# Patient Record
Sex: MALE | Race: WHITE | NOT HISPANIC OR LATINO | Employment: OTHER | URBAN - METROPOLITAN AREA
[De-identification: names, ages, dates, MRNs, and addresses within clinical notes are randomized per-mention and may not be internally consistent; named-entity substitution may affect disease eponyms.]

---

## 2021-10-08 ENCOUNTER — CONSULT (OUTPATIENT)
Dept: NEUROSURGERY | Facility: CLINIC | Age: 76
End: 2021-10-08
Payer: MEDICARE

## 2021-10-08 VITALS
HEART RATE: 79 BPM | WEIGHT: 200 LBS | BODY MASS INDEX: 28.63 KG/M2 | DIASTOLIC BLOOD PRESSURE: 84 MMHG | SYSTOLIC BLOOD PRESSURE: 124 MMHG | TEMPERATURE: 97.9 F | HEIGHT: 70 IN

## 2021-10-08 DIAGNOSIS — M48.062 LUMBAR STENOSIS WITH NEUROGENIC CLAUDICATION: ICD-10-CM

## 2021-10-08 DIAGNOSIS — R26.9 UNSPECIFIED ABNORMALITIES OF GAIT AND MOBILITY: ICD-10-CM

## 2021-10-08 DIAGNOSIS — M47.816 SPONDYLOSIS WITHOUT MYELOPATHY OR RADICULOPATHY, LUMBAR REGION: Primary | ICD-10-CM

## 2021-10-08 PROCEDURE — 99204 OFFICE O/P NEW MOD 45 MIN: CPT | Performed by: NEUROLOGICAL SURGERY

## 2021-10-08 RX ORDER — ASPIRIN 81 MG/1
81 TABLET ORAL DAILY
COMMUNITY

## 2021-10-08 RX ORDER — TAMSULOSIN HYDROCHLORIDE 0.4 MG/1
CAPSULE ORAL
COMMUNITY
Start: 2021-08-22

## 2021-10-08 RX ORDER — LISINOPRIL 5 MG/1
10 TABLET ORAL DAILY
COMMUNITY

## 2021-10-08 RX ORDER — SIMVASTATIN 10 MG
10 TABLET ORAL DAILY
COMMUNITY
Start: 2021-07-30

## 2021-10-08 RX ORDER — DONEPEZIL HYDROCHLORIDE 10 MG/1
10 TABLET, FILM COATED ORAL EVERY EVENING
COMMUNITY
Start: 2021-09-25

## 2021-10-08 RX ORDER — TRAMADOL HYDROCHLORIDE 50 MG/1
TABLET ORAL
COMMUNITY
Start: 2021-08-25

## 2021-10-18 ENCOUNTER — HOSPITAL ENCOUNTER (OUTPATIENT)
Dept: CT IMAGING | Facility: HOSPITAL | Age: 76
Discharge: HOME | End: 2021-10-18
Attending: NEUROLOGICAL SURGERY
Payer: MEDICARE

## 2021-10-18 ENCOUNTER — HOSPITAL ENCOUNTER (OUTPATIENT)
Dept: RADIOLOGY | Facility: HOSPITAL | Age: 76
Discharge: HOME | End: 2021-10-18
Payer: MEDICARE

## 2021-10-18 DIAGNOSIS — M47.816 SPONDYLOSIS WITHOUT MYELOPATHY OR RADICULOPATHY, LUMBAR REGION: ICD-10-CM

## 2021-10-18 DIAGNOSIS — M48.062 LUMBAR STENOSIS WITH NEUROGENIC CLAUDICATION: ICD-10-CM

## 2021-10-18 DIAGNOSIS — R26.9 UNSPECIFIED ABNORMALITIES OF GAIT AND MOBILITY: ICD-10-CM

## 2021-10-18 PROCEDURE — 72110 X-RAY EXAM L-2 SPINE 4/>VWS: CPT

## 2021-10-18 PROCEDURE — G1004 CDSM NDSC: HCPCS

## 2021-10-18 PROCEDURE — 70450 CT HEAD/BRAIN W/O DYE: CPT

## 2021-11-05 ENCOUNTER — OFFICE VISIT (OUTPATIENT)
Dept: NEUROSURGERY | Facility: CLINIC | Age: 76
End: 2021-11-05
Payer: MEDICARE

## 2021-11-05 VITALS
TEMPERATURE: 97.5 F | HEART RATE: 68 BPM | DIASTOLIC BLOOD PRESSURE: 102 MMHG | WEIGHT: 200 LBS | BODY MASS INDEX: 28.63 KG/M2 | SYSTOLIC BLOOD PRESSURE: 152 MMHG | HEIGHT: 70 IN

## 2021-11-05 DIAGNOSIS — R26.9 UNSPECIFIED ABNORMALITIES OF GAIT AND MOBILITY: ICD-10-CM

## 2021-11-05 DIAGNOSIS — M48.062 LUMBAR STENOSIS WITH NEUROGENIC CLAUDICATION: Primary | ICD-10-CM

## 2021-11-05 DIAGNOSIS — M47.816 SPONDYLOSIS WITHOUT MYELOPATHY OR RADICULOPATHY, LUMBAR REGION: ICD-10-CM

## 2021-11-05 PROCEDURE — 99214 OFFICE O/P EST MOD 30 MIN: CPT | Performed by: NEUROLOGICAL SURGERY

## 2022-02-22 ENCOUNTER — TELEPHONE (OUTPATIENT)
Dept: NEUROSURGERY | Facility: CLINIC | Age: 77
End: 2022-02-22

## 2022-02-22 NOTE — TELEPHONE ENCOUNTER
Received a call from David Bush requesting copy of xrays  Contacted him back, left message that he can request these images by contacting [facility] provided their contact number  Encouraged a call back with questions

## 2024-05-08 ENCOUNTER — OFFICE VISIT (OUTPATIENT)
Dept: NEUROLOGY | Facility: CLINIC | Age: 79
End: 2024-05-08
Payer: MEDICARE

## 2024-05-08 VITALS — HEART RATE: 72 BPM | DIASTOLIC BLOOD PRESSURE: 70 MMHG | OXYGEN SATURATION: 97 % | SYSTOLIC BLOOD PRESSURE: 132 MMHG

## 2024-05-08 DIAGNOSIS — G20.C PARKINSONISM, UNSPECIFIED PARKINSONISM TYPE (CMS/HCC): Primary | ICD-10-CM

## 2024-05-08 PROCEDURE — 99205 OFFICE O/P NEW HI 60 MIN: CPT | Performed by: PSYCHIATRY & NEUROLOGY

## 2024-05-08 RX ORDER — CARBIDOPA AND LEVODOPA 25; 100 MG/1; MG/1
TABLET ORAL
COMMUNITY
End: 2024-05-08 | Stop reason: SDUPTHER

## 2024-05-08 RX ORDER — SIMVASTATIN 10 MG/1
10 TABLET, FILM COATED ORAL DAILY
COMMUNITY

## 2024-05-08 RX ORDER — ASPIRIN 81 MG/1
81 TABLET ORAL DAILY
COMMUNITY

## 2024-05-08 RX ORDER — ACETAMINOPHEN 500 MG
2000 TABLET ORAL DAILY
COMMUNITY

## 2024-05-08 RX ORDER — MULTIVITAMIN
1 TABLET ORAL DAILY
COMMUNITY

## 2024-05-08 RX ORDER — AMLODIPINE BESYLATE 5 MG/1
5 TABLET ORAL
COMMUNITY
Start: 2023-11-22 | End: 2025-03-11

## 2024-05-08 RX ORDER — DULOXETIN HYDROCHLORIDE 30 MG/1
30 CAPSULE, DELAYED RELEASE ORAL DAILY
COMMUNITY

## 2024-05-08 RX ORDER — CARBIDOPA AND LEVODOPA 25; 100 MG/1; MG/1
2 TABLET ORAL 3 TIMES DAILY
Qty: 540 TABLET | Refills: 3 | Status: SHIPPED | OUTPATIENT
Start: 2024-05-08

## 2024-05-08 ASSESSMENT — PAIN SCALES - GENERAL: PAINLEVEL: 3

## 2024-05-08 NOTE — PROGRESS NOTES
Patient ID: Lawrence Ascencio                              : 1945  MRN: [id number]                                            VISIT DATE: 2024  ENCOUNTER PROVIDER: Alanna Zarate  REFERRING PROVIDER: Aashish Bacon MD    CHIEF COMPLAINT: Parkinson's disease    HISTORY OF PRESENT ILLNESS:  Lawrence Ascencio is a 78 year old right handed man who presents for evaluation of Parkinson's disease. He is accompanied by his wife and daughter. Initial symptoms were forgetfulness, soft speech. He has been following with neurologist Dr. Mccray at [place]. No records available. He was not seen for 13 months between visits.    Handwriting is smaller. He says walking is okay. Daughter notes walking is slower. He was shuffling, this is improved after doing LSVT BIG. He also did LSVT LOUD. He did cognitive therapy. He did have 1 fall in Dec in [place], he did have syncope. At this point his neurologist increased Sinemet 25/100 to 2 tabs tid (8am, 3pm, 10pm). Sinemet helps with mobility. Sinemet is lasting to the next dose.     He had hallucinations only after surgery.  Memory not good. Wife thinks shuffling and not swinging his arms happened before the memory change.    He had spinal fusion L1-S1 , had significant cognitive change after this.    He has had BP over 200, as low as 85.  Wife can tell when it goes low.  He takes amlodipine prn  He recently saw cardiology and wore a monitor.    He was a dentist, retired in     He previously saw a neurologist for dementia and was on aricept.    Poor sense of smell  He has constipation  2 urinary accidents, otherwise doing well.  No RBD    PAST MEDICAL HISTORY:  has no past medical history on file. Lyme disease, COVID19 infection, Stroke s/p PFO closure    PAST SURGICAL HISTORY:  has no past surgical history on file.  Spinal fusion L1-S1   Spinal cord stimulator  PFO closure  B/l rotator cuff surgery.    SOCIAL HISTORY:   Social History     Tobacco Use     Smoking status: Never    Smokeless tobacco: Never       FAMILY HISTORY: No [facility] PD. Maybe mother had cognitive changes later in life.    MEDICATIONS:   Current Outpatient Medications:     amLODIPine (NORVASC) 5 mg tablet, Take 5 mg by mouth., Disp: , Rfl:     aspirin 81 mg enteric coated tablet, Take 81 mg by mouth daily., Disp: , Rfl:     carbidopa-levodopa (SINEMET)  mg per tablet, TAKE 2 TABLETS THREE TIMES DAILY, Disp: , Rfl:     cholecalciferol, vitamin D3, 50 mcg (2,000 unit) capsule, Take 2,000 Units by mouth daily., Disp: , Rfl:     DULoxetine (CYMBALTA) 30 mg capsule, Take 30 mg by mouth daily., Disp: , Rfl:     multivitamin (THERAGRAN) tablet, Take 1 tablet by mouth daily., Disp: , Rfl:     simvastatin (ZOCOR) 10 mg tablet, Take 10 mg by mouth daily., Disp: , Rfl:     ALLERGIES: has No Known Allergies.     REVIEW OF SYSTEMS:  All other systems reviewed and negative except as noted in the HPI.    PHYSICAL EXAM:  Visit Vitals  /70 (BP Location: Right upper arm, Patient Position: Sitting)   Pulse 72   SpO2 97%     Sitting 130/70  Standing 132/78    GENERAL APPEARANCE: Well appearing, well nourished and well developed.  Not in acute distress.  Appears stated age.  HEAD: Normocephalic, atraumatic.  EYES:  Sclerae white. Conjunctivae clear.  NECK:  Neck was supple.  CARDIOVASCULAR:  Regular rate and rhythm.  EXTREMITIES: No clubbing, no cyanosis nor edema.  SKIN:  Dry, intact. No rashes noted. Warm to touch.  PSYCHIATRIC: Calm and cooperative with appropriate insight    NEUROLOGICAL EXAM:  MENTAL STATUS: Awake and alert with fluent language. 1/3 delayed recall  CRANIAL NERVES:  Pupils are equal, round and reactive to light. Optic discs could not be visualized on fundoscopy. Extraocular movements are intact. Normal pursuits and saccades. No nystagmus. RHH. Facial strength and sensation intact. Hearing grossly intact. Uvula and tongue are midline. No dysarthria.  MOTOR:  Normal muscle bulk and tone. Full  strength in the arms and legs proximally and distally.  Mildly reduced facial expression  Mild hypophonia  No rigidity in the arms and legs  No resting tremor. No postural and action tremor.  Mild bradykinesia on finger taps, hand opening-closing, hand supination-pronation on the right  Mild bradykinesia on toe taps and heel taps on the right  Handwriting was mildly irregular  REFLEXES: 2+ biceps, brachioradialis, and triceps bilaterally. 2+ patellar, 1+ Achilles reflexes bilaterally. Toes downgoing bilaterally. Negative Hoffmans bilaterally.  SENSATION: Intact to light touch and JESUS sense. Vibration sensation was mildly reduced at the great toes.  COORDINATION: No ataxia on finger-to-nose  GAIT: Able to stand without pushing up from the chair. Posture was moderately stooped. Gait was normal based with slightly reduced strides and heel clearance. Arm swing was slightly reduced bilaterally. Turn was normal.     LABORATORY STUDIES:  B12 382    IMAGING STUDIES:   Per care everywhere (no images available):  -MRI brain 10/2020:  1. No acute abnormality within the brain.   2. Chronic infarct within the left occipital lobe.   3. Minimal changes of chronic small vessel ischemic disease.     IMPRESSION:  Lawrence Ascencio is a 78 year old right handed man who presents for evaluation of Parkinson's disease. Would also question DLB given early cognitive changes and prior diagnosis of dementia, however denies ongoing hallucinations (previously had hallucinations only after surgery). He has very labile BP, following with cardiology.     RECOMMENDATIONS:  -Continue Sinemet 2 tab tid  -Start B12 1000mcg daily  -Monitor BP, stay hydrated, stand slowly  -Refer for neuropsych eval  -Exercise, stay mentally and socially active    Follow-up will be scheduled in 4 months, sooner if needed.    Thank you for allowing me to participate in the care of your patient.  Please feel free to contact me at any time if you have any further  questions.      Alanna Zarate MD  Movement Disorders    I spent  60 minutes on this date of service performing the following activities: obtaining history, performing examination, entering orders, documenting, and providing counseling and education.

## 2024-05-08 NOTE — LETTER
May 9, 2024     Aashish Bacon MD  [address] [place] [id number]    Patient: Lawrence Ascencio  YOB: 1945  Date of Visit: 2024      Dear Dr. Bacon:    Thank you for referring Lawrence Ascencio to me for evaluation. Below are my notes for this consultation.    If you have questions, please do not hesitate to call me. I look forward to following your patient along with you.         Sincerely,        Alanna Zarate MD        CC: No Recipients    Alanna Zarate MD  2024  5:57 AM  Sign when Signing Visit  Patient ID: Lawrence Ascencio                              : 1945  MRN: [id number]                                            VISIT DATE: 2024  ENCOUNTER PROVIDER: Alanna Zarate  REFERRING PROVIDER: Aashish Bacon MD    CHIEF COMPLAINT: Parkinson's disease    HISTORY OF PRESENT ILLNESS:  Lawrence Ascencio is a 78 year old right handed man who presents for evaluation of Parkinson's disease. He is accompanied by his wife and daughter. Initial symptoms were forgetfulness, soft speech. He has been following with neurologist Dr. Mccray at [place]. No records available. He was not seen for 13 months between visits.    Handwriting is smaller. He says walking is okay. Daughter notes walking is slower. He was shuffling, this is improved after doing LSVT BIG. He also did LSVT LOUD. He did cognitive therapy. He did have 1 fall in Dec in [place], he did have syncope. At this point his neurologist increased Sinemet 25/100 to 2 tabs tid (8am, 3pm, 10pm). Sinemet helps with mobility. Sinemet is lasting to the next dose.     He had hallucinations only after surgery.  Memory not good. Wife thinks shuffling and not swinging his arms happened before the memory change.    He had spinal fusion L1-S1 , had significant cognitive change after this.    He has had BP over 200, as low as 85.  Wife can tell when it goes low.  He takes amlodipine prn  He recently saw cardiology and wore a  monitor.    He was a dentist, retired in 2014    He previously saw a neurologist for dementia and was on aricept.    Poor sense of smell  He has constipation  2 urinary accidents, otherwise doing well.  No RBD    PAST MEDICAL HISTORY:  has no past medical history on file. Lyme disease, COVID19 infection, Stroke s/p PFO closure    PAST SURGICAL HISTORY:  has no past surgical history on file.  Spinal fusion L1-S1 2022  Spinal cord stimulator  PFO closure  B/l rotator cuff surgery.    SOCIAL HISTORY:   Social History     Tobacco Use   • Smoking status: Never   • Smokeless tobacco: Never       FAMILY HISTORY: No [facility] PD. Maybe mother had cognitive changes later in life.    MEDICATIONS:   Current Outpatient Medications:   •  amLODIPine (NORVASC) 5 mg tablet, Take 5 mg by mouth., Disp: , Rfl:   •  aspirin 81 mg enteric coated tablet, Take 81 mg by mouth daily., Disp: , Rfl:   •  carbidopa-levodopa (SINEMET)  mg per tablet, TAKE 2 TABLETS THREE TIMES DAILY, Disp: , Rfl:   •  cholecalciferol, vitamin D3, 50 mcg (2,000 unit) capsule, Take 2,000 Units by mouth daily., Disp: , Rfl:   •  DULoxetine (CYMBALTA) 30 mg capsule, Take 30 mg by mouth daily., Disp: , Rfl:   •  multivitamin (THERAGRAN) tablet, Take 1 tablet by mouth daily., Disp: , Rfl:   •  simvastatin (ZOCOR) 10 mg tablet, Take 10 mg by mouth daily., Disp: , Rfl:     ALLERGIES: has No Known Allergies.     REVIEW OF SYSTEMS:  All other systems reviewed and negative except as noted in the HPI.    PHYSICAL EXAM:  Visit Vitals  /70 (BP Location: Right upper arm, Patient Position: Sitting)   Pulse 72   SpO2 97%     Sitting 130/70  Standing 132/78    GENERAL APPEARANCE: Well appearing, well nourished and well developed.  Not in acute distress.  Appears stated age.  HEAD: Normocephalic, atraumatic.  EYES:  Sclerae white. Conjunctivae clear.  NECK:  Neck was supple.  CARDIOVASCULAR:  Regular rate and rhythm.  EXTREMITIES: No clubbing, no cyanosis nor edema.  SKIN:   Dry, intact. No rashes noted. Warm to touch.  PSYCHIATRIC: Calm and cooperative with appropriate insight    NEUROLOGICAL EXAM:  MENTAL STATUS: Awake and alert with fluent language. 1/3 delayed recall  CRANIAL NERVES:  Pupils are equal, round and reactive to light. Optic discs could not be visualized on fundoscopy. Extraocular movements are intact. Normal pursuits and saccades. No nystagmus. RHH. Facial strength and sensation intact. Hearing grossly intact. Uvula and tongue are midline. No dysarthria.  MOTOR:  Normal muscle bulk and tone. Full strength in the arms and legs proximally and distally.  Mildly reduced facial expression  Mild hypophonia  No rigidity in the arms and legs  No resting tremor. No postural and action tremor.  Mild bradykinesia on finger taps, hand opening-closing, hand supination-pronation on the right  Mild bradykinesia on toe taps and heel taps on the right  Handwriting was mildly irregular  REFLEXES: 2+ biceps, brachioradialis, and triceps bilaterally. 2+ patellar, 1+ Achilles reflexes bilaterally. Toes downgoing bilaterally. Negative Hoffmans bilaterally.  SENSATION: Intact to light touch and JESUS sense. Vibration sensation was mildly reduced at the great toes.  COORDINATION: No ataxia on finger-to-nose  GAIT: Able to stand without pushing up from the chair. Posture was moderately stooped. Gait was normal based with slightly reduced strides and heel clearance. Arm swing was slightly reduced bilaterally. Turn was normal.     LABORATORY STUDIES:  B12 382    IMAGING STUDIES:   Per care everywhere (no images available):  -MRI brain 10/2020:  1. No acute abnormality within the brain.   2. Chronic infarct within the left occipital lobe.   3. Minimal changes of chronic small vessel ischemic disease.     IMPRESSION:  Lawrence Ascencio is a 78 year old right handed man who presents for evaluation of Parkinson's disease. Would also question DLB given early cognitive changes and prior diagnosis of  dementia, however denies ongoing hallucinations (previously had hallucinations only after surgery). He has very labile BP, following with cardiology.     RECOMMENDATIONS:  -Continue Sinemet 2 tab tid  -Start B12 1000mcg daily  -Monitor BP, stay hydrated, stand slowly  -Refer for neuropsych eval  -Exercise, stay mentally and socially active    Follow-up will be scheduled in 4 months, sooner if needed.    Thank you for allowing me to participate in the care of your patient.  Please feel free to contact me at any time if you have any further questions.      Alanna Zarate MD  Movement Disorders    I spent  60 minutes on this date of service performing the following activities: obtaining history, performing examination, entering orders, documenting, and providing counseling and education.

## 2024-07-09 ENCOUNTER — TELEPHONE (OUTPATIENT)
Dept: NEUROLOGY | Facility: CLINIC | Age: 79
End: 2024-07-09
Payer: MEDICARE

## 2024-07-09 NOTE — TELEPHONE ENCOUNTER
"Pt & spouse(Riccardo-no phi form on file) while driving in car cld s/\"for the last 4-5 days Pt has been experiencing falling, loses balance in legs they get weak, losing focus. Also, need to know could Pt take carbidopa-levodopa( mg) takes 2 tabs,oral 3 times daily. Pt wanted to knwo if he can take an extra, hasn;t done this, but would like  to call him & advise. Ph#[phone](wife's cell ph#). Thx. Pt saw  as a New Pt on 05/08/24, & has a f/u scheduled for 11/19/24(Tues)@11am.  "

## 2024-07-09 NOTE — TELEPHONE ENCOUNTER
I called back and spoke with wife. They are in [place]. He almost passed out and EMS is there. /80. BP has been very high and then drops. Would not take extra Sinemet due to orthostasis. Advised to keep a close eye on BP and stay hydrated. She thinks they are taking him to the hospital. She will provide an update tomorrow.

## 2024-08-28 ENCOUNTER — OFFICE VISIT (OUTPATIENT)
Dept: NEUROLOGY | Facility: CLINIC | Age: 79
End: 2024-08-28
Payer: MEDICARE

## 2024-08-28 VITALS — DIASTOLIC BLOOD PRESSURE: 70 MMHG | SYSTOLIC BLOOD PRESSURE: 132 MMHG | OXYGEN SATURATION: 95 % | HEART RATE: 77 BPM

## 2024-08-28 DIAGNOSIS — G20.C PARKINSONISM, UNSPECIFIED PARKINSONISM TYPE (CMS/HCC): Primary | ICD-10-CM

## 2024-08-28 PROCEDURE — 99215 OFFICE O/P EST HI 40 MIN: CPT | Performed by: PSYCHIATRY & NEUROLOGY

## 2024-08-28 RX ORDER — SILODOSIN 8 MG/1
8 CAPSULE ORAL DAILY
COMMUNITY
Start: 2024-08-20

## 2024-08-28 RX ORDER — MIDODRINE HYDROCHLORIDE 2.5 MG/1
2.5 TABLET ORAL 2 TIMES DAILY PRN
Qty: 60 TABLET | Refills: 3 | Status: SHIPPED | OUTPATIENT
Start: 2024-08-28 | End: 2025-03-11

## 2024-08-28 NOTE — PATIENT INSTRUCTIONS
Reduce Sinemet by 1/2 tab every few days until you are taking 1.5 tab 3 times a day. If worsening of mobility, please increase back to 2 tabs    You may take midodrine 2.5mg tab 2 times a day as needed for low blood pressure. Please follow-up with the urologist and cardiologist to get their input on this.

## 2024-08-28 NOTE — LETTER
2024    To whom it may concern:    Lawrence Ascencio ( 1945) is under my neurological care.  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    Please contact me with any questions or concerns,    Alanna Zarate MD  Neurologist

## 2024-08-28 NOTE — PROGRESS NOTES
Patient ID: Lawrence Ascencio                              : 1945  MRN: [id number]                                            VISIT DATE: 2024  ENCOUNTER PROVIDER: Alanna Zarate  REFERRING PROVIDER: No ref. provider found    REASON FOR VISIT: Lawrence Ascencio is a 78 year old right handed man who follows up for Parkinson's disease. Would also question DLB given early cognitive changes and prior diagnosis of dementia, however had denied ongoing hallucinations (previously had hallucinations only after surgery). He has very labile BP, following with cardiology. He was last seen 3-4 months ago. He is accompanied by his wife and daughter.     INTERIM HISTORY:  He takes Sinemet 25/100 2 tabs tid (8:30am, 1:30pm, 6pm).  Does not notice it wearing off.    He went to the ER in July for presyncope.  Wife reached out in the interim and he was referred to PT.    He has continued episodes of presyncope and legs collapse. No syncope. BP is low    Wife showed me BP on the phone ranging 60s/40 up to 200s/120s  Rarely he takes amlodipine prn.  Wearing compression stockings. Staying hydrated.  No trouble using his hands  When asked about hallucinations, sometimes feels a presence is there.  Memory is not good    Did not schedule neuropsych eval do to concern for BP.  Taking B12 supplement    He previously did LSVT BIG/ LOUD, cognitive therapy.     He previously saw a neurologist for dementia and was on aricept.    PAST MEDICAL HISTORY:  has no past medical history on file. Lyme disease, COVID19 infection, Stroke s/p PFO closure    PAST SURGICAL HISTORY:  has no past surgical history on file.  Spinal fusion L1-S1   Spinal cord stimulator  PFO closure  B/l rotator cuff surgery.    SOCIAL HISTORY:   Social History     Tobacco Use    Smoking status: Never    Smokeless tobacco: Never   He was a dentist, retired in     FAMILY HISTORY: No [facility] PD. Maybe mother had cognitive changes later in life.    MEDICATIONS:   Current  Outpatient Medications:     amLODIPine (NORVASC) 5 mg tablet, Take 5 mg by mouth., Disp: , Rfl:     aspirin 81 mg enteric coated tablet, Take 81 mg by mouth daily., Disp: , Rfl:     carbidopa-levodopa (SINEMET)  mg per tablet, Take 2 tablets by mouth 3 (three) times a day., Disp: 540 tablet, Rfl: 3    cholecalciferol, vitamin D3, 50 mcg (2,000 unit) capsule, Take 2,000 Units by mouth daily., Disp: , Rfl:     DULoxetine (CYMBALTA) 30 mg capsule, Take 30 mg by mouth daily., Disp: , Rfl:     multivitamin (THERAGRAN) tablet, Take 1 tablet by mouth daily., Disp: , Rfl:     silodosin (RAPAFLO) 8 mg capsule, Take 8 mg by mouth daily., Disp: , Rfl:     simvastatin (ZOCOR) 10 mg tablet, Take 10 mg by mouth daily., Disp: , Rfl:     ALLERGIES: has No Known Allergies.     REVIEW OF SYSTEMS:  All other systems reviewed and negative except as noted in the HPI.    PHYSICAL EXAM:  Visit Vitals  /70 (BP Location: Right upper arm, Patient Position: Sitting)   Pulse 77   SpO2 95%     Sitting 134/78  Standing 132/70    GENERAL APPEARANCE: Well appearing, well nourished and well developed.  Not in acute distress.  Appears stated age.  HEAD: Normocephalic, atraumatic.  EYES:  Sclerae white. Conjunctivae clear.  NECK:  Neck was supple.  CARDIOVASCULAR:  Regular rate and rhythm.  EXTREMITIES: No clubbing, no cyanosis nor edema.  SKIN:  Dry, intact. No rashes noted. Warm to touch.  PSYCHIATRIC: Calm and cooperative with appropriate insight    NEUROLOGICAL EXAM:  MENTAL STATUS: Awake and alert with fluent language.  CRANIAL NERVES:  Pupils are equal, round and reactive to light. Optic discs could not be visualized on fundoscopy. Extraocular movements are intact. Normal pursuits and saccades. No nystagmus.  Facial strength and sensation intact. Hearing grossly intact. Uvula and tongue are midline. No dysarthria.  MOTOR:  Normal muscle bulk and tone. Full strength in the arms and legs proximally and distally.  Mildly reduced facial  expression  Mild hypophonia  No rigidity in the arms and legs  No resting tremor. No postural and action tremor.  Mild bradykinesia on finger taps, hand opening-closing, hand supination-pronation b/l  Mild bradykinesia on toe taps and heel taps b/l  COORDINATION: No ataxia on finger-to-nose  GAIT: Able to stand without pushing up from the chair after several attempts. Posture was moderately stooped. Gait was normal based with slightly reduced strides and heel clearance. Arm swing was slightly reduced bilaterally. Turn was normal.     LABORATORY STUDIES:  B12 382    IMAGING STUDIES:   Per care everywhere (no images available):  -MRI brain 10/2020:  1. No acute abnormality within the brain.   2. Chronic infarct within the left occipital lobe.   3. Minimal changes of chronic small vessel ischemic disease.     IMPRESSION:  Lawrence Ascencio is a 78 year old right handed man who presents for evaluation of Parkinson's disease. Would also question DLB given early cognitive changes and prior diagnosis of dementia; when asked about hallucinations, sometimes feels a presence is there. He has very labile BP with low BP/presyncope which is problematic, following with cardiology.     RECOMMENDATIONS:  -Reduce Sinemet by 1/2 tab every few days until you are taking 1.5 tab 3 times a day. If worsening of mobility, please increase back to 2 tabs  -You may take midodrine 2.5mg tab 2 times a day as needed for low blood pressure. Please follow-up with the urologist and cardiologist to get their input on this.   -He has been referred to PT  -They requested a letter for his timeshare that it is a medical hardship to travel. This was done.  -Continue B12 1000mcg daily  -Monitor BP, stay hydrated, stand slowly  -Schedule neuropsych eval  -Exercise, stay mentally and socially active    Follow-up will be scheduled in 6 weeks with Valarie CHAUHAN, sooner if needed.    Thank you for allowing me to participate in the care of your patient.  Please feel  free to contact me at any time if you have any further questions.      Alanna Zarate MD  Movement Disorders

## 2024-09-12 ENCOUNTER — TELEPHONE (OUTPATIENT)
Dept: NEUROLOGY | Facility: CLINIC | Age: 79
End: 2024-09-12
Payer: MEDICARE

## 2024-09-12 NOTE — TELEPHONE ENCOUNTER
Received call from PT at [facility].    He is currently at PT.   /102 sitting. 132/80 standing  He is asymptomatic   Will hold PT for now. Advised to f/u with cardiology.

## 2024-10-08 NOTE — PROGRESS NOTES
Patient ID: Lawrence Ascencio                              : 1945  MRN: [id number]                                            VISIT DATE: 10/8/2024  ENCOUNTER PROVIDER: Valarie Whitt  REFERRING PROVIDER: No ref. provider found    REASON FOR VISIT: Lawrence Ascencio is a 78 year old right handed man who follows up for Parkinson's disease. Would also question DLB given early cognitive changes and prior diagnosis of dementia, however had denied ongoing hallucinations (previously had hallucinations only after surgery). He has very labile BP, following with cardiology. He saw Dr Zarate 24.  He is accompanied by his wife and daughter.     INTERIM HISTORY:    Cut back sinemet for a week and he became stiffer so went back to sinemet 2 tabs TID     Saw cardiology NP, per family didn't recommend any medication   Using wheelchair when he goes out, no falls, this has been very helpful     Using walker in the house,   Uses compression stockings, abdominal binder   Careful to take his time getting up and down     Would like to pursue home PT    Prior OV 24:   He takes Sinemet 25/100 2 tabs tid (8:30am, 1:30pm, 6pm).  Does not notice it wearing off.    He went to the ER in July for presyncope.  Wife reached out in the interim and he was referred to PT.    He has continued episodes of presyncope and legs collapse. No syncope. BP is low    Wife showed me BP on the phone ranging 60s/40 up to 200s/120s  Rarely he takes amlodipine prn.  Wearing compression stockings. Staying hydrated.  No trouble using his hands  When asked about hallucinations, sometimes feels a presence is there.  Memory is not good    Did not schedule neuropsych eval do to concern for BP.  Taking B12 supplement    He previously did [facility], cognitive therapy.     He previously saw a neurologist for dementia and was on aricept.    PAST MEDICAL HISTORY:  has no past medical history on file. Lyme disease, COVID19 infection, Stroke s/p PFO  "closure    PAST SURGICAL HISTORY:  has no past surgical history on file.  Spinal fusion L1-S1 2022  Spinal cord stimulator  PFO closure  B/l rotator cuff surgery.    SOCIAL HISTORY:   Social History     Tobacco Use    Smoking status: Never    Smokeless tobacco: Never   He was a dentist, retired in 2014    FAMILY HISTORY: No [facility] PD. Maybe mother had cognitive changes later in life.    MEDICATIONS:   Current Outpatient Medications:     amLODIPine (NORVASC) 5 mg tablet, Take 5 mg by mouth., Disp: , Rfl:     aspirin 81 mg enteric coated tablet, Take 81 mg by mouth daily., Disp: , Rfl:     carbidopa-levodopa (SINEMET)  mg per tablet, Take 2 tablets by mouth 3 (three) times a day., Disp: 540 tablet, Rfl: 3    cholecalciferol, vitamin D3, 50 mcg (2,000 unit) capsule, Take 2,000 Units by mouth daily., Disp: , Rfl:     DULoxetine (CYMBALTA) 30 mg capsule, Take 30 mg by mouth daily., Disp: , Rfl:     midodrine (PROAMATINE) 2.5 mg tablet, Take 1 tablet (2.5 mg total) by mouth 2 (two) times a day as needed (Low blood pressure with SBP less than 100)., Disp: 60 tablet, Rfl: 3    multivitamin (THERAGRAN) tablet, Take 1 tablet by mouth daily., Disp: , Rfl:     silodosin (RAPAFLO) 8 mg capsule, Take 8 mg by mouth daily., Disp: , Rfl:     simvastatin (ZOCOR) 10 mg tablet, Take 10 mg by mouth daily., Disp: , Rfl:     ALLERGIES: has No Known Allergies.     REVIEW OF SYSTEMS:  All other systems reviewed and negative except as noted in the HPI.    PHYSICAL EXAM:  Visit Vitals  BP (!) 150/80 (BP Location: Right upper arm, Patient Position: Sitting)   Resp 16   Ht 1.778 m (5' 10\")   Wt 90.7 kg (200 lb)   BMI 28.70 kg/m²     GENERAL APPEARANCE: Well appearing, well nourished and well developed.  Not in acute distress.  Appears stated age.  HEAD: Normocephalic, atraumatic.  EYES:  Sclerae white. Conjunctivae clear.  NECK:  Neck was supple.  CARDIOVASCULAR:  Regular rate and rhythm.  EXTREMITIES: No clubbing, no cyanosis nor " edema.  SKIN:  Dry, intact. No rashes noted. Warm to touch.  PSYCHIATRIC: Calm and cooperative with appropriate insight    NEUROLOGICAL EXAM:  MENTAL STATUS: Awake and alert with fluent language.  CRANIAL NERVES:  Pupils are equal, round and reactive to light. Optic discs could not be visualized on fundoscopy. Extraocular movements are intact. Normal pursuits and saccades. No nystagmus.  Facial strength and sensation intact. Hearing grossly intact. Uvula and tongue are midline. No dysarthria.  MOTOR:  Normal muscle bulk and tone. Full strength in the arms and legs proximally and distally.  Mildly reduced facial expression  Mild hypophonia  No rigidity in the arms and legs  No resting tremor. No postural and action tremor.  Mild bradykinesia on finger taps, hand opening-closing, hand supination-pronation b/l  Mild bradykinesia on toe taps and heel taps b/l  COORDINATION: No ataxia on finger-to-nose  GAIT: Able to stand without pushing up from the chair after several attempts. Posture was moderately stooped. Gait was normal based with slightly reduced strides and heel clearance. Arm swing was slightly reduced bilaterally. Turn was normal.     LABORATORY STUDIES:  B12 382    IMAGING STUDIES:   Per care everywhere (no images available):  -MRI brain 10/2020:  1. No acute abnormality within the brain.   2. Chronic infarct within the left occipital lobe.   3. Minimal changes of chronic small vessel ischemic disease.     IMPRESSION:  Lawrence Ascencio is a 78 year old right handed man who presents for evaluation of Parkinson's disease. Would also question DLB given early cognitive changes and prior diagnosis of dementia; when asked about hallucinations, sometimes feels a presence is there. He has very labile BP with low BP/presyncope which is problematic, following with cardiology.     RECOMMENDATIONS:  -continue current dose of sinemet  -You may take midodrine 2.5mg tab 2 times a day as needed for low blood pressure. Please  follow-up with the urologist and cardiologist to get their input on this.   -will send request to [facility] for home PT  -Continue B12 1000mcg daily  -Monitor BP, stay hydrated, stand slowly  -Schedule neuropsych eval  -Exercise, stay mentally and socially active  -Recommend motorized wheelchair for pt as he is a significant fall risk even with a walker or cane.     Patient was seen today for a motorized wheelchair evaluation.  Patient requires the use of a motorized wheelchair due to weakness in the bilateral upper extremities and bilateral lower extremities, resulting in the inability to safely ambulate secondary to Parkinson's disease.  Patient is unable to accomplish basic in-home ADLs such as safely getting from bedroom to kitchen for meals or bathroom for toileting/hygiene.  Patient is unable to use a cane/walker or self propel any type of manual wheelchair due to upper extremity and lower extremity weakness, poor balance, and poor endurance.  Patient is unable to operate a scooter safely and cannot transfer on/off safely.  Although the member has dementia, it is mild and he will still be safe to operate a power wheelchair.  Please see therapist evaluation for further assessment.    Follow-up already scheduled with Dr Zarate for next month.     Thank you for allowing me to participate in the care of your patient.  Please feel free to contact me at any time if you have any further questions.    GISSEL Blanco    I spent  41 minutes on this date of service performing the following activities: obtaining history, performing examination, entering orders, documenting, preparing for visit, obtaining / reviewing records, and providing counseling and education.

## 2024-10-09 ENCOUNTER — OFFICE VISIT (OUTPATIENT)
Dept: NEUROLOGY | Facility: CLINIC | Age: 79
End: 2024-10-09
Payer: MEDICARE

## 2024-10-09 VITALS
WEIGHT: 200 LBS | RESPIRATION RATE: 16 BRPM | HEIGHT: 70 IN | DIASTOLIC BLOOD PRESSURE: 80 MMHG | SYSTOLIC BLOOD PRESSURE: 150 MMHG | BODY MASS INDEX: 28.63 KG/M2

## 2024-10-09 DIAGNOSIS — G20.C PARKINSONISM, UNSPECIFIED PARKINSONISM TYPE (CMS/HCC): Primary | ICD-10-CM

## 2024-10-09 DIAGNOSIS — R26.9 ABNORMAL GAIT: ICD-10-CM

## 2024-10-09 DIAGNOSIS — Z91.81 AT RISK FOR FALLS: ICD-10-CM

## 2024-10-09 PROCEDURE — 99214 OFFICE O/P EST MOD 30 MIN: CPT | Performed by: PHYSICIAN ASSISTANT

## 2025-03-06 ENCOUNTER — HOSPITAL ENCOUNTER (OUTPATIENT)
Dept: PSYCHOLOGY | Facility: CLINIC | Age: 80
Discharge: HOME | End: 2025-03-06
Payer: MEDICARE

## 2025-03-06 DIAGNOSIS — F02.80 MAJOR NEUROCOGNITIVE DISORDER DUE TO MULTIPLE ETIOLOGIES WITHOUT BEHAVIORAL DISTURBANCE (CMS/HCC): Primary | ICD-10-CM

## 2025-03-06 PROCEDURE — 96136 PSYCL/NRPSYC TST PHY/QHP 1ST: CPT | Performed by: PSYCHOLOGIST

## 2025-03-06 PROCEDURE — 96133 NRPSYC TST EVAL PHYS/QHP EA: CPT | Performed by: PSYCHOLOGIST

## 2025-03-06 PROCEDURE — 96132 NRPSYC TST EVAL PHYS/QHP 1ST: CPT | Performed by: PSYCHOLOGIST

## 2025-03-06 PROCEDURE — 96137 PSYCL/NRPSYC TST PHY/QHP EA: CPT | Performed by: PSYCHOLOGIST

## 2025-03-06 NOTE — PROGRESS NOTES
"NEUROPSYCHOLOGICAL EVALUATION    CONFIDENTIAL   FOR PROFESSIONAL USE ONLY    Name: Lawrence Ascencio                              : 1945  Evaluation date: 3/6/2025    Lawrence Ascencio, : 1945, is a 79 y.o. right-handed male with 20 years of formal education, referred by his Neurology team Dr. Alanna Zarate MD and Valarie Whitt PA-C for a comprehensive neuropsychological evaluation.    The following information was obtained via clinical interview with the patient and his wife Riccardo (with the patient's consent) on 3/6/2025 and a review of available medical records, though this should not be considered a comprehensive review and the reader is referred to the patient's complete medical record for a thorough summary. Verbal informed consent was obtained after a review/discussion of reasons for this evaluation, evaluation procedures, and issues regarding professional records and confidentiality.    HISTORY OF PRESENTING PROBLEM:  Mr. Ascencio has been followed by neurology for several years.  Per [facility] Neurologist Dr. Derik Rudolph MD (3/9/2021), \"In summary he is a 75-year old male with mild cognitive complaints since he retired in  who developed significant confusion while infected with COVID-19 in mid 2020. Since that time he has reported gradual improvement in his cognitive symptoms (but still does not feel completely normal) and presented to me for evaluation. His MoCA testing was in the normal range (27/30) at our initial visit in summer 2020. . .MRI brain notable for some degree of volume loss. Cognitive labs are unrevealing. His MoCA today is 20/30 (progressed from 6 months ago). This makes me concerned for a dementing process such as Alzheimer's. I recommended that he start Aricept.\"  In 2020, he lost points on the MoCA for -1 cube draw and -2 memory (got both with context cues).  In 2021, he lost points for -1 clock draw, -1 digit span backwards, -1 serial sevens, " "-1 repetition, -1 phonemic fluency (7 words with multiple paraphasic errors and poor search strategy), and -5 forward delayed recall.  Medical record review indicates that Mr. Ascencio followed with Dr. Rudolph until April 2021.  He later followed with Dr. Mccray at [facility], but records are not available.  He then established with Dr. Zarate's team in May 2024.  Per RUBENS Whitt (10/9/2024), Mr. Ascencio follows for Parkinson's disease.  \"Would also question DLB given early cognitive changes and prior diagnosis of dementia, however had denied ongoing hallucinations (previously had hallucinations only after surgery). He has very labile BP, following with cardiology. At one point Wife showed me BP on the phone ranging 60s/40 up to 200s/120s.\"  He has not undergone previous neuropsychological evaluation.    At the current appointment, Mr. Ascencio's wife reported significant cognitive change after he underwent a 4.5-hour back surgery in 2022.  She stated \"When he came out of surgery, he was totally incoherent.  He was not recognizing people.\"  He completed 2.5 weeks inpatient at [facility] and then completed in-home therapy.  They have noticed significant improvement, but he has not returned to his baseline.  Initially, she denied significant cognitive difficulty prior to the surgery, but when the examiner brought up records from 2021 showing cognitive complaints from after his [facility] in 2014, they did agree that he was experiencing cognitive difficulty at that time.  Riccardo stated they did not notice any difference with the Aricept prescription from Dr. Rudolph.  Riccardo reported her  was diagnosed with Parkinson's 2 to 3 years ago, but that previous neurologists suspected this potential diagnosis.  Medical record review finds no mention of parkinsonism until shuffling gait in late 2021, and he participated in [facility] therapies in 2022 or 2023.  Currently, regarding memory, Mr. Ascencio reported " "\"slight\" forgetfulness and occasional trouble keeping track of the plot when he is reading.  His wife stated his memory is \"definitely changing.\"  She stated he is not repetitive with questions and does not seem to struggle with remembering conversations, but he might not remember what happened a few days ago.  He reported some word finding difficulty but denied paraphasias.  His wife denied significant concerns with expressive language or comprehension.  They reported mild trouble with attention but no significant concerns with processing speed.  He did have a hearing evaluation at [facility] which suggested he needed hearing aids (but he did not follow-up), and Riccardo stated she questions whether he truly has attention and memory difficulty versus hearing impairment.  They denied punding and compulsive behaviors.    Mr. Ascencio lives at home with his wife and their dog.  No significant concerns with ADLs were reported.  No concerns or changes in aspects of IADLs including financial management, meal preparation/turning off water, and medication management, although when queried by the examiner he needed extensive prompting to name some of his medications or the conditions for which they are prescribed.  His wife sorts his pills and he is able to remember to take them with the help of an alarm on his phone.  Regarding technology, he has had some trouble with the television remote, but they recently changed service providers.  Mr. Ascencio stopped driving in July 2024 at the direction of his PCP due to concerns for physical symptoms, perception, and slowed processing.  He was not getting lost and there were no fender benders.    MEDICATION:  Duloxetine HCL 30 mg (for pain)  Ibuprofen 200 mg 3x a day  One-a-Day vitamin  Carbidopa-levodopa  mg 2 tablets 3x a day  Acetaminophen 81 mg  Baby asprin 81 mg  Calcium 600 mg + D3  Simvastatin 10 mg  Amlodipine 5 mg as needed    MEDICAL HISTORY:  Mr. Ascencio stated he was " "the product of an uncomplicated pregnancy and delivery and met developmental milestones appropriately.  No major childhood illnesses were reported.  Medical history is significant for Parkinson's disease and stroke s/p PFO closure.  He follows with Dr. Zarate and Valarie Whitt PA-C for Parkinson's disease management.  He is currently participating in in-home physical therapy, which his wife stated is going very well and he his mobility has significantly increased.  Parkinson's symptoms include more stiffness and rigidity with slow movement, with only very occasional right foot tremor.  He follows with Cardiology, per Sallie BUNCH (12/31/24) \"Fortunately patient has had improvement in symptoms [labile BP] with increased hydration, taking his time with position changes, avoiding standing for long periods of time and wearing compression stockings. Unfortunately, this patient will be difficult to manage. He has very labile BPs and orthostatic hypotension in the setting of autonomic dysfunction from Parkinson's disease.\"  Riccardo stated his blood pressure has been more stable recently.  Mr. Ascencio contracted COVID-19 early on in the pandemic in March 2020 with moderate to severe symptoms for 6 weeks.  He was not hospitalized, and his wife stated that although it took him a while to recover, they have not noticed lasting symptoms.  As mentioned above, after back surgery in 2022 he had poor reaction to anesthesia, with delirium and hallucinations.  He denied a history of head injury and seizure.  He experienced a stroke at age 59, with symptoms including dizziness and vision changes.  MRI brain (2020) showed chronic infarct within the left occipital lobe and minimal changes of chronic small vessel ischemic disease.  There is mild prominence of the ventricles and sulci consistent with generalized cerebral volume loss.  Sleep was described as good.  His wife stated he does snore a little bit, which she notices if she " "wakes up in the middle the night and she will adjust his position.  No REM sleep disorder behaviors were reported.  Daytime energy was described as reduced, but improved with physical therapy.  Appetite was cited as good with decline in sense of smell since anders COVID-19.    SUBSTANCE USE HISTORY:  Alcohol use is very occasional.  He does not use recreational drugs or tobacco.  Caffeine includes 1 to 2 cups of coffee daily.    PSYCHIATRIC HISTORY:  Mr. Ascencio denied a history of formal psychiatric diagnoses, treatment, and hospitalizations.  He described his recent mood as \"normal.  I am not depressed or anxious about anything.\"  His wife denied concerns with mood or changes in personality.  Per medical records, he experienced hallucinations after general anesthesia for back surgery in 2022.  When asked about current hallucinations, he stated he sometimes feels \"a presence\" on his right side or may misjudge where his wife is spatially, but he denied outright visual hallucinations.  Notably, they reported he has had some vision trouble on his right side since his left occipital stroke.  He denied suicidal/homicidal ideation, timothy, auditory hallucinations, and delusions.    FAMILY HISTORY:  Family neurologic and psychiatric histories are unremarkable.    SOCIAL, EDUCATIONAL, AND OCCUPATIONAL HISTORY:  Mr. Ascencio was born and raised in [place] and moved to [place] in 1970.  He grew up with 1 brother and 2 sisters, but only 1 sister is currently still living.  He and Riccardo have been  for 55 years and have 3 daughters and 4 grandchildren.  Two of their daughters live nearby, the other lives in [place].  He enjoys reading the newspaper, doing quiz puzzles, reading, and PT exercises.  They go to a Parkinson's support group once a month.  Mr. Ascencio denied difficulty with learning or attention in school and earned above average grades.  He stated his college GPA was 3.2.  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    BEHAVIORAL OBSERVATIONS:  Mr. Ascencio arrived on time and was accompanied by his wife . He was dressed and groomed appropriately for the evaluation and appeared to maintain proper hygiene. His gait was  slow and stiff , balance appeared grossly steady, and he ambulated with the assistance of a walker . He is right-handed and no gross motor abnormalities were observed. Vision (corrected with glasses) was adequate, but hearing was not always adequate for the purposes of the evaluation. Eye contact was good. Mr. Ascencio was pleasant and cooperative and mood and affect were appropriate although his facial expression was somewhat flat. Rapport was easily established and maintained. Speech was generally fluent, prosodic, and non-paraphasic with occasional word-finding that improved as the conversation progressed. Thought processes were logical and goal-directed and he served as a variable historian with occasional correction from Riccardo during the clinical interview. He showed intact comprehension of conversational speech but required repetition and elaboration of test instructions. He maintained adequate attention and did not demonstrate impulsive behavior. Visuo-perceptual difficulty was noted, resulting in distortion of copied items. Several tasks were discontinued due to confusion, including a symbol-digit processing task and oral mental flexibility.     Performance Validity: Mr. Ascencio performed adequately on embedded measures of effort and the results are believed to accurately represent his current neurocognitive function.    ASSESSMENT INSTRUMENTS: Animal Fluency; [place] Verbal Learning Test, Third Edition, Brief (CVLT-3 Brief); Clinical Interview; Clock Drawing Test; Controlled Oral Word Association Test (COWAT, FAS); Generalized Anxiety Disorder 7-Item Scale (DESIREE-7); Line Bisection; Mini  Mental Status Exam (MMSE); Neuropsychological Assessment Battery (NAB; Language Module); Overlapping Figures; Patient Health Questionnaire (PHQ-9); Repeatable Battery for the Assessment of Neuropsychological Status (RBANS, Form A; selected subtests); Review of Records; Test of Practical Judgment (TOPJ-9); Trail Making Test A & B; Wechsler Adult Intelligence Scale, Fourth Edition (WAIS-IV; selected subtests); Wechsler Memory Scale, Fourth Edition (WMS-IV; selected subtests); Wide Range Achievement Test, Fourth Edition (WRAT-4; Word Reading).     SUMMARY OF TEST RESULTS:   The following standardized test scores and qualitative descriptors are used to describe test performance:     Standard Scores  Scaled Scores T-Scores Percentiles Classification   130 and above 16 and above 70 and above >98th Exceptionally High    120-129 14-15 63-69 91st to 97th Above Average   110-119 12-13 57-62 75th to 90th High Average    8-11 43-56 25th to 74th Average   80-89 6-7 37-42 9th to 24th Low Average   70-79 4-5 30-36 3rd to 8th Below Average   69 and below 3 and below 29 and below <2nd Exceptionally Low       These scores are for confidential and professional use only and should never be interpreted without consideration of the preceding integrated narrative report.      Test Raw Score Normative Score Description   Mental Status        MMSE 21/30 -- -- Cognitive Impairment    Estimated Premorbid Functioning       WRAT-4 Word Reading  63  Average   Attention and Information Processing       WAIS-IV Working Memory Index          Digit Span 17 ss 6 Low Average         Forward 7 ss 7 Low Average         Backward 6 ss 8 Average         Sequencing 4 ss 6 Low Average   Oral Trail Making Test A 6  High Average   Executive Functioning       Oral Trail Making Test B D/C SS -- Impaired   Clock Drawing Test 4/10 -- -- Impaired   TOPJ-9 26  High Average   Language        NAB Naming Test 30 T 58 High Average   MSAST         "  Naming 10/10 -- -- Within Normal Limits      Automatic Speech 10/10 -- -- Within Normal Limits      Repetition 10/10 -- -- Within Normal Limits      Yes/No Comprehension 20/20 -- -- Within Normal Limits      Following Instructions 10/10 -- -- Within Normal Limits      Reading Instructions 10/10 -- -- Within Normal Limits      Writing/Spelling 8/10 -- -- Abnormal   COWAT (FAS) 21 SS 69 Exceptionally Low   Animal Fluency 16 SS 88 Low Average   Visuospatial        Line Bisection -- -- -- Within Normal Limits   Overlapping Figures 11/13 -- -- Within Normal Limits    RBANS Figure Copy  5 ss 1 Exceptionally Low   RBANS JOLO 14 %ile 17-25 Low Average   Memory       CVLT-3 Brief          Trials 1 - 4 Correct (1-3-4-3) -- SS 53 Exceptionally Low      Short Delay Free Recall 3 ss 2 Exceptionally Low      Long Delay Free Recall 0 ss 1 Exceptionally Low      Long Delay Cued Recall 3 ss 3 Exceptionally Low      Recognition Discriminability  74 ss 2 Exceptionally Low   WMS-IV          Logical Memory I 29 ss 10 Average      Logical Memory II 9 ss 7 Low Average      LM Recognition 19 %ile 51-75 Average      Visual Reproduction I 12 ss 3 Exceptionally Low      Visual Reproduction II 0 ss 2 Exceptionally Low      VR Recognition  1 %ile 3-9 Below Average   Mood & Behavior       PHQ-9 2 -- -- Minimal Depression   DESIREE-7 2 -- -- Minimal Anxiety     Mental Status Screening:  Mr. Ascencio's performance was impaired on a cognitive screening measure (MMSE=21/30); he lost 3 points in orientation (off day of week by 1 day, incorrect town \"[place]\", and did not know hospital name), 2 points spelling WORLD backwards (\"DLW\"), 1 point for recalling 2/3 words after a brief delay, 1 point on a 3-step instruction, 1 point in writing a sentence (\"I wish I home\", with part of the sentence written vertically), and 1 point on copying overlapping pentagons (italo pentagon and heart shape).    Estimated Premorbid Function:  Single word reading " "ability was average.    Attention and Processing Speed:  Attention and processing speed were variable but intact in the broad average range. When asked to repeat and manipulate progressively longer strings of digits, Mr. Ascencio performed in the average to low average range. General oral attention and processing speed was high average with 0 errors. An oral symbol-digit coding task was attempted, but discontinued due to confusion.    Executive Function:  Executive function was notable for weakness. Clock drawing was impaired: he italo the clock face and numbers, but wrote an additional 10 between 11 and 12 with no hands when asked to set the hands to \"10 minutes after 11.\" Oral mental flexibility was impaired due to discontinuation of the task because of confusion and multiple errors. When asked to provide problem-solving solutions to hypothetical real-world scenarios involving financial, safety, and medical decision-making, he performed in the high average range.    Language:  Language was notable for weakness. Object naming was high average. On a screening task, naming, automatic speech, sentence repetition, yes/no comprehension, and following oral and written instructions were errorless. Spelling was notable for errors when asked to write \"[place]\": \"[place].\" Verbal fluency was exceptionally low for phonemic (letter) fluency and low average for semantic (category) fluency.    Visuoperceptual:  General visual fields and perception of overlapping figures were intact. His copy of a complex geometric figure was exceptionally low and notable for complete omission of the top half of the figure, and distortion, rotation, and duplication of other aspects of the design. Judgment of line orientation was low average.    Memory:  Abnormalities were present in aspects of verbal and visual learning and memory. [place] verbal learning and delayed memory were exceptionally low. Mr. Ascencio learned " 1-3-4-3 items from a 9-item word list across 4 learning trials and freely recalled 0 items after a 10-minute delay; with category prompts he recalled 3 words. His recognition discriminability was exceptionally low for 8 out of 9 words correctly recognized and 6 false positive errors, including both related and unrelated words. Contextual verbal learning and delayed recall for stories were average to low average (0 details recalled with cue for 1 story, despite 10/14 details learned initially), respectively, with average recognition memory. Visual learning and delayed memory of geometric designs were exceptionally low (0 details recalled at delay), respectively, with below average recognition (1/7 correct).    Emotional/Behavior Rating:   On self-report measures, Mr. Ascencio endorsed minimal symptoms of depression and denied thoughts of self-harm. He reported minimal anxiety.      SUMMARY AND DIAGNOSTIC IMPRESSION:  Lawrence Ascencio is a 79 y.o. male referred for a neuropsychological evaluation to assess cognitive complaints. Relevant medical history is significant for Parkinson's disease and left occipital stroke s/p PFO closure.  Per medical records, neurologist Dr. Rudolph from [facility] diagnosed him with dementia in 2021 with a MoCA of 20/30 (decline from 27/36 months prior).  MRI brain (2020) showed chronic infarct within the left occipital lobe and minimal changes of chronic small vessel ischemic disease.  There is mild prominence of the ventricles and sulci consistent with generalized cerebral volume loss.    On objective testing, Mr. Ascencio demonstrated average estimated premorbid functioning.  Variability was noted across and within cognitive domains.  Attention ranged from average to low average, with high average processing speed.  Executive functioning was impaired for mental flexibility and clock drawing, but high average for problem solving.  Language was notable for high average object naming,  exceptionally low phonemic fluency but low average semantic fluency, and abnormality with writing/spelling.  Visual-spatial abilities were notable for low average judgment of line orientation but exceptionally low copy of a complex geometric figure (complete omission of top half of the figure, with distortion and duplication of remaining aspects).  [place] verbal learning and delayed recall for a word list were exceptionally low, as was recognition (8/9 correct, 6 false positives including semantically unrelated errors).  Contextual verbal learning for stories was average, with low average delayed recall (did not recall any details of a story he previously learned well), but average recognition.  Visual learning and delayed recall were exceptionally low, with low recognition (1/7 correct).  This memory profile suggests difficulty with attention and executive aspects of learning and memory, as well as accelerated forgetting.  Emotionally, he did not endorse significant symptoms of depression and anxiety.    Taken together, Mr. Ascencio's neuropsychological profile demonstrates significant cognitive decline from estimated premorbid function based on educational and occupational history.  Specifically, he demonstrates difficulty with aspects of attention, executive functioning, phonemic fluency, writing/spelling, and visual-spatial deficits.  He also demonstrates impairment in aspects of rote verbal learning and delayed recall and visual learning and delayed recall; his performance is generally adequate in contextual verbal memory, but with some forgetting still noted.      Etiology of cognitive complaints is likely multifactorial.  Difficulty with attention, processing speed, and executive function is expected in Parkinson's disease.  Lewy body dementia may be low on the diagnostic differential, given lack of frequent fluctuation in cognition, no recurrent visual hallucinations, and no REM sleep disorder behaviors.  Visuospatial difficulty is often seen in DLB, but it is difficult to determine to what extent his history of occipital stroke is affecting visuo-perceptual ability.  He demonstrates difficulty with memory, but the cognitive testing pattern is less typical of Alzheimer's disease, given intact naming and semantic fluency stronger than phonemic fluency.  Additionally, given impaired MoCA with Neurology in 2021, more significant cognitive decline would be expected, particularly with language and contextual verbal memory, over 3.5 to 4 years if it was AD.  Significant contributors to his neurocognitive disorder may also include his occipital stroke, with concern for possible other vascular or ischemic events; he has not had updated imaging since 2020, which might be helfpul for diagnostic clarification.  Mr. Ascencio underwent back surgery with at least 4.5 hours of anesthesia in 2022, with notable cognitive decline observed by his wife afterward; anesthesia in older adults can contribute to cognitive decline.  Untreated sleep apnea and hearing difficulty can also negatively affect cognition.  Recommendations follow below.    Diagnosis:  Major neurocognitive disorder due to multiple etiologies (occipital stroke, general anesthesia, Parkinson's disease)    RECOMMENDATIONS:  Mr. Ascencio should continue to follow-up with his Neurology team and other specialty providers as needed for optimal health management.   Updated brain imaging (most recent 2020) is recommended for diagnostic clarification.  He may benefit from a referral to neuro-ophthalmology, given visuospatial deficits and history of occipital stroke.  He should discuss with his doctor whether referral for a sleep study is warranted. Untreated sleep apnea increases the risk of heart attack, stroke, and cognitive decline.   He is strongly encouraged to purchase the recommended hearing aids from his hearing evaluation. Untreated hearing difficulty can contribute  to cognitive impairment.    His family should monitor his ability to manage instrumental activities of daily living (e.g., financial management, medications, meal preparation) to ensure continued accuracy and safety. Healthcare and financial POA paperwork should be completed, but do not need to be activated, for future management.  Abstinence from driving remains appropriate, given difficulty with executive function and complex visuospatial ability.    Tips for managing day-to-day cognitive complaints include:  A written checklist of tasks for the day may be beneficial for establishing a daily schedule .   Daily checklists can be written in a planner, or kept next to a large calendar in a central location where you will see it every day. Use highlighters or stickers to call attention to important deadlines.  Keep a calendar in a centralized location (e.g., in the kitchen, by the front door) to record appointments, bill due dates, and other information where it will be seen every day. Color-coding events may be further helpful for certain types of information to stand out.   Syncing the planner with Google Calendar on your cell phone to have access to schedules while away from home and set reminders will be helpful.   Write important information in a notebook or planner that you can carry with you to record information throughout the day. Keep notes in one place (not multiple post-its, pieces of paper, etc.).  Keep frequently used items in a consistent place (e.g., keys, wallet, paperwork).  Repetition is helpful for learning and internalizing information. Repeat information (e.g., important tasks, grocery lists, names of new acquaintances) aloud or to yourself several times before moving on in conversation or activity. This will cause your brain to pay more attention to the information, making it more likely that you will remember it.  Important information should be broken down into manageable chunks (i.e., short  list) that is easier to take in without being overwhelming.   Providing context and linking [id number] information to knowledge already remembered will be easier than straight memorization (e.g., grouping information by category, mnemonic devices).  Your brain is like a muscle - use it or lose it! Participate in cognitively- and socially-stimulating activities such as reading, word puzzles, board games, listening to familiar music, coloring/painting, household chores, and social interaction. Your local senior center can be a great resource for activities and socialization.    Resources for family include:  The 36-Hour Day by Iva Aguirre MA and Sujit Burns MD, MPH is a helpful resource for family and caregivers of individuals with neurocognitive disorders. It provides information, tips for managing behavior and cognitive difficulties, and considerations when planning for the future.  [facility] on [employer]ging www.[email or web address]/departments/aging  [facility] [place] is a great source of information about planning, family/caregiver resources, local resources, and support groups. https://www.caregiver.org/    Mr. Ascencio is encouraged to maintain a healthy diet and exercise, as approved by his physician. Healthy diet (such as the DASH or Mediterranean Diets) and exercise are not only beneficial for physical health, but research has demonstrated significant positive impact on mental health and cognitive function.      These results and recommendations were reviewed with Mr. Ascencio and his wife during a feedback session, and all questions were answered to their satisfaction. Thank you for the opportunity to participate in Lawrence Ascencio's care. Please feel free to contact me at [phone] with any further questions regarding this evaluation.      Respectfully Submitted,     Mary Moulton Psy.D.  Licensed Clinical Neuropsychologist       This note was created with voice recognition  software. Inadvertent dictation errors should be disregarded. Please contact my office for clarification.    A total of 6 hours 12 minutes was spent in chart review, test selection, clinical interview, test administration and scoring, clinical interpretation, report writing, and feedback.     Charges at Close of Encounter for Professional Psychological/Neuropsychological Testing Evaluation, Administration, and Scoring Services  22595-Lqggskjfcm Services Base Code: 1 unit (3/6/2025)  29465-Irswgsrdsw Services Add-On: 3 units (3/6/2025)  96136-Neuropsychologist Testing and Administration Base Code: 1 unit (3/6/2025)  96137-Neuropsychologist Testing and Administration Add-On: 4 units (3/6/2025)

## 2025-03-10 ENCOUNTER — HOSPITAL ENCOUNTER (OUTPATIENT)
Dept: PSYCHOLOGY | Facility: CLINIC | Age: 80
Discharge: HOME | End: 2025-03-10
Payer: MEDICARE

## 2025-03-10 DIAGNOSIS — F02.80 MAJOR NEUROCOGNITIVE DISORDER DUE TO MULTIPLE ETIOLOGIES WITHOUT BEHAVIORAL DISTURBANCE (CMS/HCC): Primary | ICD-10-CM

## 2025-03-10 PROCEDURE — 99999 PR OFFICE/OUTPT VISIT,PROCEDURE ONLY: CPT | Performed by: PSYCHOLOGIST

## 2025-03-10 NOTE — PROGRESS NOTES
Request for Consent  Patient provided consent to treat via telehealth technology.Patient understands the telehealth visit will be billed to their insurance or patient directly.  Patient was informed only the patient, family, and the clinician are permitted on the telehealth visit, visits are not recorded by the clinician, and the patient is not permitted to record the visit. Patient was provided information on how to access HIPAA compliant platform. Clinician confirmed identification of patient by name and birthdate, provider name, location of patient in [place], and callback number in case disconnected. Patient informed of the right to choose the form of care delivery, including the right to refuse a telehealth visit.     Patient Response to Request for Consent: Yes  Telehealth Platform utilized: telephone  Visit Type performed: Audio only  The patient is at home: Yes  The patient is in [place]:   yes  Those who participated in the encounter: Patient, Provider, and pt's wife Riccardo  Patient Call back number: [phone]    Lawrence Ascencio participated in a telephone feedback session to discuss the results of neuropsychological testing performed on 3/6/2025. The full neuropsychological evaluation report can be found in the patient's chart.    The results and recommendations were reviewed with Mr. Ascencio and his wife during the feedback session, and all questions were answered to their satisfaction. Thank you for the opportunity to participate in Lawrence Ascencio's care. Please feel free to contact me at [phone] with any further questions regarding this evaluation.      Respectfully Submitted,     Mary Moulton Psy.D.  Licensed Clinical Neuropsychologist     Time spent providing telephone neuropsychological feedback: 37 minutes  Charges in addition to services billed for the evaluation encounter on 3/6/2025:  No additional charges

## 2025-03-11 ENCOUNTER — OFFICE VISIT (OUTPATIENT)
Dept: NEUROLOGY | Facility: CLINIC | Age: 80
End: 2025-03-11
Payer: MEDICARE

## 2025-03-11 VITALS
DIASTOLIC BLOOD PRESSURE: 60 MMHG | SYSTOLIC BLOOD PRESSURE: 130 MMHG | HEART RATE: 66 BPM | RESPIRATION RATE: 15 BRPM | OXYGEN SATURATION: 100 %

## 2025-03-11 DIAGNOSIS — G25.89 OTHER SPECIFIED EXTRAPYRAMIDAL AND MOVEMENT DISORDERS: ICD-10-CM

## 2025-03-11 DIAGNOSIS — E53.8 B12 DEFICIENCY: ICD-10-CM

## 2025-03-11 DIAGNOSIS — G20.A1 PARKINSON'S DISEASE WITHOUT DYSKINESIA OR FLUCTUATING MANIFESTATIONS (CMS/HCC): Primary | ICD-10-CM

## 2025-03-11 PROCEDURE — G2211 COMPLEX E/M VISIT ADD ON: HCPCS | Performed by: PSYCHIATRY & NEUROLOGY

## 2025-03-11 PROCEDURE — 99215 OFFICE O/P EST HI 40 MIN: CPT | Performed by: PSYCHIATRY & NEUROLOGY

## 2025-03-11 RX ORDER — CARBIDOPA AND LEVODOPA 25; 100 MG/1; MG/1
1 TABLET, EXTENDED RELEASE ORAL NIGHTLY
Qty: 90 TABLET | Refills: 3 | Status: SHIPPED | OUTPATIENT
Start: 2025-03-11 | End: 2026-03-11

## 2025-03-11 NOTE — PROGRESS NOTES
"Patient ID: Lawrence Ascencio                              : 1945  MRN: [id number]                                            VISIT DATE: 3/11/2025  ENCOUNTER PROVIDER: Alanna Zarate  REFERRING PROVIDER: Aashish Bacon MD    REASON FOR VISIT: Lawrence Ascencio is a 79 year old right handed man who follows up for Parkinson's disease. Would also question DLB given early cognitive changes and prior diagnosis of dementia, however had denied ongoing hallucinations (previously had hallucinations only after surgery). He has very labile BP, following with cardiology. He was last seen 6 months ago, seen by Valarie CHAUHAN in the interim. He is accompanied by his wife.    INTERIM HISTORY:  He is doing well. BP has been stable. Has not had syncope.  He has been drinking more water and gatorade.    He is going to PT which is helping mobility.     His hardest time is getting up during the night.     He takes Sinemet 25/100 2 tabs tid (8:30am, 1:30pm, 7:30pm).    When he tried reducing Sinemet, he was more stiff.    No hallucinations.    He underwent neuropsych eval with Dr. 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     He previously saw a neurologist for dementia and was on aricept.    PAST MEDICAL HISTORY:  has no past medical history on file. Lyme disease, COVID19 infection, Stroke s/p PFO closure    PAST SURGICAL HISTORY:  has no past surgical history on file.  Spinal fusion L1-S1   Spinal cord stimulator  PFO closure  B/l rotator cuff surgery.    SOCIAL HISTORY:   Social History     Tobacco Use    Smoking status: Never    Smokeless tobacco: Never   He was a dentist, retired in     FAMILY HISTORY: No [facility] PD. Maybe mother had cognitive changes later in life.    MEDICATIONS:   Current Outpatient Medications:     aspirin 81 mg enteric coated tablet, Take 81 mg by mouth " daily., Disp: , Rfl:     carbidopa-levodopa (SINEMET)  mg per tablet, Take 2 tablets by mouth 3 (three) times a day., Disp: 540 tablet, Rfl: 3    cholecalciferol, vitamin D3, 50 mcg (2,000 unit) capsule, Take 2,000 Units by mouth daily., Disp: , Rfl:     DULoxetine (CYMBALTA) 30 mg capsule, Take 30 mg by mouth daily., Disp: , Rfl:     midodrine (PROAMATINE) 2.5 mg tablet, Take 1 tablet (2.5 mg total) by mouth 2 (two) times a day as needed (Low blood pressure with SBP less than 100)., Disp: 60 tablet, Rfl: 3    multivitamin (THERAGRAN) tablet, Take 1 tablet by mouth daily., Disp: , Rfl:     silodosin (RAPAFLO) 8 mg capsule, Take 8 mg by mouth daily., Disp: , Rfl:     simvastatin (ZOCOR) 10 mg tablet, Take 10 mg by mouth daily., Disp: , Rfl:     amLODIPine (NORVASC) 5 mg tablet, Take 5 mg by mouth. (Patient not taking: Reported on 3/11/2025), Disp: , Rfl:     ALLERGIES: has No Known Allergies.     REVIEW OF SYSTEMS:  All other systems reviewed and negative except as noted in the HPI.    PHYSICAL EXAM:  Visit Vitals  /60 (BP Location: Left upper arm, Patient Position: Standing)   Pulse 66   Resp 15   SpO2 100%     Sitting 130/70  Standing 130/60    GENERAL APPEARANCE: Well appearing, well nourished and well developed.  Not in acute distress.  Appears stated age.  HEAD: Normocephalic, atraumatic.  EYES:  Sclerae white. Conjunctivae clear.  NECK:  Neck was supple.  CARDIOVASCULAR:  Regular rate and rhythm.  EXTREMITIES: No clubbing, no cyanosis nor edema.  SKIN:  Dry, intact. No rashes noted. Warm to touch.  PSYCHIATRIC: Calm and cooperative with appropriate insight    NEUROLOGICAL EXAM:  MENTAL STATUS: Awake and alert with fluent language.  CRANIAL NERVES:  Pupils are equal, round and reactive to light. Optic discs could not be visualized on fundoscopy. Extraocular movements are intact. Normal pursuits and saccades. RHH. No nystagmus.  Facial strength and sensation intact. Hearing grossly intact. Uvula and  tongue are midline. No dysarthria.  MOTOR:  Normal muscle bulk and tone. Full strength in the arms and legs proximally and distally.  Mildly reduced facial expression  Mild hypophonia  No rigidity in the arms and legs  No resting tremor. No postural and action tremor.  Mild bradykinesia on finger taps, hand opening-closing, hand supination-pronation on the right  Mild bradykinesia on toe taps and heel taps on the right  COORDINATION: No ataxia on finger-to-nose  GAIT: Able to stand without pushing up from the chair on the 2nd attempt. Posture was moderately stooped. Gait was normal based with slightly reduced strides and heel clearance. Arm swing was slightly reduced bilaterally. Turn was normal.     LABORATORY STUDIES:  B12 382    IMAGING STUDIES:   Per care everywhere (no images available):  -MRI brain 10/2020:  1. No acute abnormality within the brain.   2. Chronic infarct within the left occipital lobe.   3. Minimal changes of chronic small vessel ischemic disease.     IMPRESSION:  Lawrence Ascencio is a 79 year old right handed man who presents for evaluation of Parkinson's disease. Would also question DLB given early cognitive changes and prior diagnosis of dementia; however denies recent hallucinations. He has very labile BP with low BP/presyncope, this has improved and he is doing better. Would hold off on a cholinesterase inhibitor at this time given his presyncopal history.    RECOMMENDATIONS:  -Start Sinemet CR 25/100 1 tab qhs to help with mobility during the night  -Continue Sinemet 25/100 2 tabs tid  -Obtain current MRI brain  -F/u with audiologist re: hearing aids  -Obtain labs b12, folate  -Will be finishing PT, will then start OT  -Continue B12 1000mcg daily  -Monitor BP, stay hydrated, stand slowly  -Exercise, stay mentally and socially active    Follow-up will be scheduled in 6 months, sooner if needed.    Thank you for allowing me to participate in the care of your patient.  Please feel free to  contact me at any time if you have any further questions.      Alanna Zarate MD  Movement Disorders    I attest that this visit supports the complexity inherent to evaluation and management associated with medical care services that serve as the continuing focal point for all needed health care services and/or medical care services that are part of ongoing care related to this patient's single, serious condition or a complex condition.

## 2025-06-04 RX ORDER — CARBIDOPA AND LEVODOPA 25; 100 MG/1; MG/1
TABLET ORAL
Qty: 540 TABLET | Refills: 3 | Status: SHIPPED | OUTPATIENT
Start: 2025-06-04

## 2025-06-04 NOTE — TELEPHONE ENCOUNTER
carbidopa-levodopa (SINEMET)  mg per tablet     Medicine Refill Request    Last Office Visit: 3/11/2025   Last Telemedicine Visit: Visit date not found     Next Office Visit: Visit date not found  Next Telemedicine Visit: Visit date not found

## 2025-07-22 ENCOUNTER — APPOINTMENT (OUTPATIENT)
Dept: RADIOLOGY | Age: 80
End: 2025-07-22
Payer: MEDICARE

## 2025-09-02 ENCOUNTER — APPOINTMENT (OUTPATIENT)
Dept: LAB | Facility: CLINIC | Age: 80
End: 2025-09-02
Attending: PSYCHIATRY & NEUROLOGY
Payer: MEDICARE

## 2025-09-02 ENCOUNTER — OFFICE VISIT (OUTPATIENT)
Dept: NEUROLOGY | Facility: CLINIC | Age: 80
End: 2025-09-02
Payer: MEDICARE

## 2025-09-02 VITALS
DIASTOLIC BLOOD PRESSURE: 70 MMHG | HEART RATE: 70 BPM | SYSTOLIC BLOOD PRESSURE: 120 MMHG | BODY MASS INDEX: 28.63 KG/M2 | OXYGEN SATURATION: 96 % | WEIGHT: 200 LBS | HEIGHT: 70 IN | RESPIRATION RATE: 12 BRPM

## 2025-09-02 DIAGNOSIS — G20.A1 PARKINSON'S DISEASE WITHOUT DYSKINESIA OR FLUCTUATING MANIFESTATIONS (CMS/HCC): Primary | ICD-10-CM

## 2025-09-02 DIAGNOSIS — G20.A1 PARKINSON'S DISEASE WITHOUT DYSKINESIA OR FLUCTUATING MANIFESTATIONS (CMS/HCC): ICD-10-CM

## 2025-09-02 LAB
BASOPHILS # BLD: 0.08 K/UL (ref 0.01–0.1)
BASOPHILS NFR BLD: 1 %
DIFFERENTIAL METHOD BLD: ABNORMAL
EOSINOPHIL # BLD: 0.16 K/UL (ref 0.04–0.54)
EOSINOPHIL NFR BLD: 1.9 %
ERYTHROCYTE [DISTWIDTH] IN BLOOD BY AUTOMATED COUNT: 13.3 % (ref 11.6–14.4)
HCT VFR BLD AUTO: 39.2 % (ref 40.1–51)
HGB BLD-MCNC: 12.9 G/DL (ref 13.7–17.5)
IMM GRANULOCYTES # BLD AUTO: 0.03 K/UL (ref 0–0.08)
IMM GRANULOCYTES NFR BLD AUTO: 0.4 %
LYMPHOCYTES # BLD: 1.83 K/UL (ref 1.2–3.5)
LYMPHOCYTES NFR BLD: 22.2 %
MCH RBC QN AUTO: 32.9 PG (ref 28–33.2)
MCHC RBC AUTO-ENTMCNC: 32.9 G/DL (ref 32.2–36.5)
MCV RBC AUTO: 100 FL (ref 83–98)
MONOCYTES # BLD: 0.8 K/UL (ref 0.3–1)
MONOCYTES NFR BLD: 9.7 %
NEUTROPHILS # BLD: 5.36 K/UL (ref 1.7–7)
NEUTS SEG NFR BLD: 64.8 %
NRBC BLD-RTO: 0 %
PLATELET # BLD AUTO: 185 K/UL (ref 150–350)
PMV BLD AUTO: 11.8 FL (ref 9.4–12.4)
RBC # BLD AUTO: 3.92 M/UL (ref 4.5–5.8)
WBC # BLD AUTO: 8.26 K/UL (ref 3.8–10.5)

## 2025-09-02 PROCEDURE — 99215 OFFICE O/P EST HI 40 MIN: CPT | Performed by: PSYCHIATRY & NEUROLOGY

## 2025-09-02 PROCEDURE — 36415 COLL VENOUS BLD VENIPUNCTURE: CPT

## 2025-09-02 PROCEDURE — 81003 URINALYSIS AUTO W/O SCOPE: CPT

## 2025-09-02 PROCEDURE — 80053 COMPREHEN METABOLIC PANEL: CPT

## 2025-09-02 PROCEDURE — G2211 COMPLEX E/M VISIT ADD ON: HCPCS | Performed by: PSYCHIATRY & NEUROLOGY

## 2025-09-02 PROCEDURE — 85025 COMPLETE CBC W/AUTO DIFF WBC: CPT

## 2025-09-02 RX ORDER — SILDENAFIL 50 MG/1
TABLET, FILM COATED ORAL
COMMUNITY
Start: 2025-06-17

## 2025-09-02 RX ORDER — TAMSULOSIN HYDROCHLORIDE 0.4 MG/1
CAPSULE ORAL
COMMUNITY
Start: 2025-06-16

## 2025-09-02 RX ORDER — AMLODIPINE BESYLATE 5 MG/1
5 TABLET ORAL DAILY
COMMUNITY
Start: 2025-06-16

## 2025-09-02 RX ORDER — FINASTERIDE 5 MG/1
5 TABLET, FILM COATED ORAL DAILY
COMMUNITY
Start: 2025-08-08

## 2025-09-02 RX ORDER — RIVASTIGMINE TARTRATE 1.5 MG/1
1.5 CAPSULE ORAL 2 TIMES DAILY
Qty: 180 CAPSULE | Refills: 3 | Status: SHIPPED | OUTPATIENT
Start: 2025-09-02 | End: 2026-09-02

## 2025-09-03 LAB
ALBUMIN SERPL-MCNC: 4.6 G/DL (ref 3.5–5.7)
ALP SERPL-CCNC: 81 IU/L (ref 34–125)
ALT SERPL-CCNC: 6 IU/L (ref 7–52)
ANION GAP SERPL CALC-SCNC: 5 MEQ/L (ref 3–15)
AST SERPL-CCNC: 15 IU/L (ref 13–39)
BACTERIA URNS QL MICRO: ABNORMAL /HPF
BILIRUB SERPL-MCNC: 0.7 MG/DL (ref 0.3–1.2)
BILIRUB UR QL STRIP.AUTO: NEGATIVE MG/DL
BUN SERPL-MCNC: 24 MG/DL (ref 7–25)
CALCIUM SERPL-MCNC: 9.5 MG/DL (ref 8.6–10.3)
CHLORIDE SERPL-SCNC: 104 MEQ/L (ref 98–107)
CLARITY UR REFRACT.AUTO: ABNORMAL
CO2 SERPL-SCNC: 30 MEQ/L (ref 21–31)
COLOR UR AUTO: YELLOW
CREAT SERPL-MCNC: 1.1 MG/DL (ref 0.7–1.3)
EGFRCR SERPLBLD CKD-EPI 2021: >60 ML/MIN/1.73M*2
GLUCOSE SERPL-MCNC: 88 MG/DL (ref 70–99)
GLUCOSE UR STRIP.AUTO-MCNC: NEGATIVE MG/DL
HGB UR QL STRIP.AUTO: NEGATIVE
HYALINE CASTS #/AREA URNS LPF: ABNORMAL /LPF
KETONES UR STRIP.AUTO-MCNC: NEGATIVE MG/DL
LEUKOCYTE ESTERASE UR QL STRIP.AUTO: NEGATIVE
MUCOUS THREADS URNS QL MICRO: 3 /LPF
NITRITE UR QL STRIP.AUTO: NEGATIVE
PH UR STRIP.AUTO: 6.5 [PH]
POTASSIUM SERPL-SCNC: 5 MEQ/L (ref 3.5–5.1)
PROT SERPL-MCNC: 7.3 G/DL (ref 6–8.2)
PROT UR QL STRIP.AUTO: 1
RBC #/AREA URNS HPF: ABNORMAL /HPF
SODIUM SERPL-SCNC: 139 MEQ/L (ref 136–145)
SP GR UR REFRACT.AUTO: 1.02
SQUAMOUS URNS QL MICRO: ABNORMAL /HPF
UROBILINOGEN UR STRIP-ACNC: 0.2 EU/DL
WBC #/AREA URNS HPF: ABNORMAL /HPF